# Patient Record
Sex: MALE | Race: WHITE
[De-identification: names, ages, dates, MRNs, and addresses within clinical notes are randomized per-mention and may not be internally consistent; named-entity substitution may affect disease eponyms.]

---

## 2019-07-23 NOTE — HP
*** AMENDED REPORT NOW INCLUDES DESIGNATED COSIGNER - ESIGNED BEFORE 
ADJUSTMENTS ***



PREOPERATIVE HISTORY AND PHYSICAL:

 

DATE OF ADMISSION:  07/30/19

 

DATE OF OFFICE VISIT:

 

ATTENDING PHYSICIAN:  Dr. Helen Napier.* (DICTATED BY NICHOLE TAYLOR)

 

SURGERY TO BE PERFORMED:  Right total knee arthroplasty.

 

CHIEF COMPLAINT:  Right knee pain.

 

HISTORY OF PRESENT ILLNESS:  Mr. Fernandes is a 64-year-old male with over 3 years 
of increasingly severe right knee pain.  He has difficulty walking for more 
than 1 block.  He has difficulty ambulating on stairs and uneven surfaces. 
Antiinflammatories, rest, and elevation have helped this pain somewhat.  He has 
had cortisone injections and has worn a brace without relief of pain.  He now 
elects to proceed with right total knee arthroplasty.

 

PAST MEDICAL HISTORY:  Significant for hypertension, high cholesterol, 
osteoarthritis, type 2 diabetes, morbid obesity.

 

PAST SURGICAL HISTORY:  He has had a TURP, appendectomy, hernia repair, 
cervical spine surgery, and partial parathyroid removal.

 

MEDICATIONS:

1.  Metformin HCl ER 50 mg 2 tablets b.i.d.

2.  Humalog KwikPen 200 units per mL t.i.d. with meals.

3.  Atorvastatin 20 mg daily.

4.  Finasteride 5 mg 1 p.o. daily.

5.  Invokana 300 mg 1 p.o. daily.

6.  Levemir FlexTouch 100 units per mL, administered 110 units subcu daily.

7.  Aspirin 81 mg daily.

8.  CoQ10 supplement 100 mg daily.

9.  Vitamin D3 at 5000 International Units daily.

10.  Irbesartan 300 mg daily.

11.  Amlodipine 5 mg p.o. daily.

 

ALLERGIES:  To PENICILLIN, which caused rash and hives.

 

FAMILY HISTORY:  Significant for hypertension and cancer.

 

SOCIAL HISTORY:  The patient lives with his wife, works as a .  
Remote history of tobacco use, quit 25 years ago.  Denies use of alcohol or 
recreational drug use.

 

REVIEW OF SYSTEMS:  Reviewed with the patient today.  Denies recent loss of 
consciousness, lightheadedness, dizziness, shortness of breath, chest pain, 
palpitations, gastrointestinal and genitourinary discomfort.  No neurologic 
symptoms.

 

                               PHYSICAL EXAMINATION

 

GENERAL:  The patient is alert and oriented x3, in no acute distress, pleasant, 
cooperative.

 

VITAL SIGNS:  Height 69.50 inches, weight 285.  Pulse 80, /84.

 

HEENT:  PERRLA.  EOMI.

 

NECK:  Supple.  Nontender.

 

LUNGS:  Clear to auscultation without wheeze.

 

HEART:  Regular rate and rhythm.  No murmur auscultated.

 

ABDOMEN:  Nontender.  Normoactive bowel sounds x4 quadrant.

 

EXTREMITIES:  Upper extremities within normal limits.  Right knee:  His skin is 
clear without excoriations or open lesions.  He has a moderate effusion to the 
right knee, 15 degrees to 120 degrees flexion with patellofemoral crepitus and 
pain.  There is a varus deformity.  There is tenderness along the medial joint 
line.  There is no gross varus or valgus instability.  Mild pitting edema is 
noted at the ankle.  He has active dorsiflexion and plantar flexion.  Full 
sensation to light touch, 2+ dorsalis pedis pulse.

 

 ASSESSMENT:  Advanced osteoarthritis of the right knee, medial and 
patellofemoral compartments.

 

PLAN:  The patient has failed conservative management and elects to proceed 
with right total knee arthroplasty.  He is scheduled for surgical intervention 
with Dr. Helen Napier at [facility] on 07/30/19.  He has been cleared by his primary 
care provider and his endocrinologist for surgery.  He will follow up in the 
office in roughly 10 to 14 days postoperatively.

 

 ____________________________________ NICHOLE TAYLOR

 

[id number] #: [id number]

MTDD

## 2019-07-30 ENCOUNTER — HOSPITAL ENCOUNTER (INPATIENT)
Dept: HOSPITAL 25 - AA | Age: 64
LOS: 2 days | Discharge: HOME | DRG: 302 | End: 2019-08-01
Attending: ORTHOPAEDIC SURGERY | Admitting: ORTHOPAEDIC SURGERY
Payer: COMMERCIAL

## 2019-07-30 DIAGNOSIS — Z80.8: ICD-10-CM

## 2019-07-30 DIAGNOSIS — Z87.891: ICD-10-CM

## 2019-07-30 DIAGNOSIS — Z79.84: ICD-10-CM

## 2019-07-30 DIAGNOSIS — Z79.4: ICD-10-CM

## 2019-07-30 DIAGNOSIS — Z88.0: ICD-10-CM

## 2019-07-30 DIAGNOSIS — Z79.899: ICD-10-CM

## 2019-07-30 DIAGNOSIS — R35.0: ICD-10-CM

## 2019-07-30 DIAGNOSIS — N40.0: ICD-10-CM

## 2019-07-30 DIAGNOSIS — M25.761: ICD-10-CM

## 2019-07-30 DIAGNOSIS — E78.00: ICD-10-CM

## 2019-07-30 DIAGNOSIS — M17.11: Primary | ICD-10-CM

## 2019-07-30 DIAGNOSIS — I10: ICD-10-CM

## 2019-07-30 DIAGNOSIS — Z80.1: ICD-10-CM

## 2019-07-30 DIAGNOSIS — E11.9: ICD-10-CM

## 2019-07-30 DIAGNOSIS — Z79.82: ICD-10-CM

## 2019-07-30 DIAGNOSIS — E66.01: ICD-10-CM

## 2019-07-30 DIAGNOSIS — E78.5: ICD-10-CM

## 2019-07-30 DIAGNOSIS — Z82.49: ICD-10-CM

## 2019-07-30 PROCEDURE — 85014 HEMATOCRIT: CPT

## 2019-07-30 PROCEDURE — 85018 HEMOGLOBIN: CPT

## 2019-07-30 PROCEDURE — 36415 COLL VENOUS BLD VENIPUNCTURE: CPT

## 2019-07-30 PROCEDURE — 62323 NJX INTERLAMINAR LMBR/SAC: CPT

## 2019-07-30 PROCEDURE — 85049 AUTOMATED PLATELET COUNT: CPT

## 2019-07-30 PROCEDURE — 0SRC069 REPLACEMENT OF RIGHT KNEE JOINT WITH OXIDIZED ZIRCONIUM ON POLYETHYLENE SYNTHETIC SUBSTITUTE, CEMENTED, OPEN APPROACH: ICD-10-PCS | Performed by: ORTHOPAEDIC SURGERY

## 2019-07-30 PROCEDURE — C1776 JOINT DEVICE (IMPLANTABLE): HCPCS

## 2019-07-30 PROCEDURE — 80048 BASIC METABOLIC PNL TOTAL CA: CPT

## 2019-07-30 RX ADMIN — INSULIN LISPRO SCH: 100 INJECTION, SOLUTION INTRAVENOUS; SUBCUTANEOUS at 17:44

## 2019-07-30 RX ADMIN — TRAMADOL HYDROCHLORIDE PRN MG: 50 TABLET, FILM COATED ORAL at 18:46

## 2019-07-30 RX ADMIN — OXYCODONE HYDROCHLORIDE PRN MG: 5 CAPSULE ORAL at 21:43

## 2019-07-30 RX ADMIN — ATORVASTATIN CALCIUM SCH MG: 20 TABLET, FILM COATED ORAL at 18:47

## 2019-07-30 RX ADMIN — DOCUSATE SODIUM SCH MG: 100 CAPSULE, LIQUID FILLED ORAL at 20:15

## 2019-07-30 RX ADMIN — MAGNESIUM HYDROXIDE SCH ML: 400 SUSPENSION ORAL at 20:16

## 2019-07-30 RX ADMIN — INSULIN GLARGINE SCH UNITS: 100 INJECTION, SOLUTION SUBCUTANEOUS at 21:42

## 2019-07-30 RX ADMIN — ACETAMINOPHEN SCH: 325 TABLET ORAL at 18:46

## 2019-07-30 RX ADMIN — OXYCODONE HYDROCHLORIDE AND ACETAMINOPHEN PRN TAB: 5; 325 TABLET ORAL at 17:22

## 2019-07-30 NOTE — PN
Progress Note





- Progress Note


Date of Service: 07/30/19


Note: 





Pt seen at bedside, pain is well controlled. Denies CP, SOB, dizziness, nausea. 

Dressing CDI, DF/PF intact, sensation intact to light touch distally.

## 2019-07-30 NOTE — CONS
CC:  Dr. Li; Dr. Napier; NICHOLE Gilman *

 

CONSULTATION REPORT:

 

DATE OF CONSULT:  19

 

PRIMARY CARE PROVIDER:  Dr. Li.

 

ORTHOPEDIC SURGEON:  Dr. Napier.

 

REQUESTING PROVIDER IN CONSULTATION:  NICHOLE Gilman.

 

ATTENDING PHYSICIAN:  Dr. Torres (dictated by NICHOLE Marsh).

 

REASON FOR CONSULTATION:  Co-medical management.

 

HISTORY OF PRESENT ILLNESS/HOSPITAL COURSE:  I refer you to Joaquina Mcdonnell's 
history and physical dictated on 19 for full and complete details, but in 
short, Mr. Fernandes is a 64-year-old male with a past medical history of diabetes, 
hypertension, hyperlipidemia, and osteoarthritis who presented to [facility] today for 
an elective right total knee arthroplasty having failed conservative therapy.  
He is seen postoperatively in the PACU.  He is complaining of drowsiness and 
fatigue.  He denies chest pain, shortness of breath, vision changes, headache.  
He denies pain in the knees, stating that he has decreased sensation to 
bilateral lower extremities due to nerve block.  He denies abdominal pain, 
nausea, vomiting, diarrhea.  He denies numbness and tingling in the extremities.

 

PAST MEDICAL HISTORY:

1.  Diabetes mellitus.

2.  Hypertension.

3.  Hyperlipidemia.

4.  BPH.

5.  Obesity, BMI of 41.3.

6.  Osteoarthritis.

 

PAST SURGICAL HISTORY:  TURP, appendectomy, hernia repair x2, partial 
parathyroidectomy, cervical spine.

 

HOME MEDICATIONS:

1.  Amlodipine 5 mg p.o. daily.

2.  Aspirin 81 mg p.o. daily.

3.  Atorvastatin 20 mg p.o. q.p.m.

4.  Canagliflozin 300 mg p.o. daily.

5.  Vitamin D3 cap 5000 units p.o. daily.

6.  Finasteride 5 mg p.o. daily.

7.  Insulin detemir 50 units subcu q.a.m., 60 units at bedtime.

8.  Insulin lispro sliding scale 16 to 24 units subcu t.i.d. with meals.

9.  Irbesartan 300 mg p.o. daily.

10.  Metformin 1000 mg p.o. b.i.d.

11.  CoQ10 at 100 mg p.o. daily.

 

DRUG ALLERGIES:  PENICILLIN, hives.

 

FAMILY HISTORY:  Father, hypertension,  due to lung cancer.  Mother, 
dementia.  Maternal grandmother, MI at age 60.  Paternal grandmother  
during childbirth.  Paternal grandfather, melanoma, brain tumor.

 

SOCIAL HISTORY:  The patient states that he quit smoking approximately 25 years 
ago.  Prior to that, he smoked less than a pack per day for approximately 10 
years. He does not drink alcohol.  He is a . He lives with his wife
; he has 4 children and 10 grandchildren.  In the event that he is unable to 
make his own medical decisions, he has appointed his daughter and healthcare 
proxy, Oksana Khan, to be his surrogate decision maker.

 

REVIEW OF SYSTEMS:  A 10-point review of systems has been performed and all the 
pertinent positives and negatives are in the HPI.  All other systems are 
negative.

 

PHYSICAL EXAMINATION:  General:  Mr. Fernandes is a well-developed, well-nourished, 
obese, older white male who is sitting up in bed.  He appears groggy, but is 
cooperative and appropriate and awake.  Vital Signs:  Temperature 97.2 temporal
, heart rate 67, respiratory rate 17, oxygen saturation 97% on 2 L O2, blood 
pressure 139/82.  HEENT:  PERRL.  EOMI.  Nonicteric sclerae.  Hearing grossly 
intact.  Oral mucous membranes are dry.  Cardiovascular:  Regular rate and 
rhythm with S1, S2 present without murmurs, rubs, clicks, or gallops.  There is 
no JVD.  There is no peripheral edema.  Radial and pedal pulses are palpable.  
Respiratory:  Symmetrical chest expansion without use of accessory muscles.  
Lungs:  Clear to auscultation bilaterally without rhonchi, wheezes, or rubs.  
There is no clubbing or cyanosis distally.  Abdomen:  Obese.  Bowel sounds in 
all quadrants.  The abdomen is soft. There is no tenderness to palpation.  
Musculoskeletal:  The patient is able to move all of his extremities.  The 
right knee has a clean, dry, and intact dressing with cryo unit in place.  Neuro
:  The patient is awake.  He is alert and oriented x3 with cranial nerves 
grossly intact.  He again is able to move all of his extremities.  He has 
decreased sensation to bilateral lower extremities.

 

ASSESSMENT AND PLAN:  Mr. Fernandes is a 64-year-old male with a past medical 
history of diabetes, hypertension, hyperlipidemia who presented to [facility] today 
for an elective right total knee arthroplasty.  The patient will be admitted 
inpatient for:

1.  Right total knee arthroplasty.  Management per orthopedics.

2.  Diabetes mellitus.  The patient is on detemir 50 a.m., 60 p.m., lispro 
sliding scale, canagliflozin, metformin at home.  His 2 oral medications will 
be held while he is in the hospital.  We will continue his long-acting insulin 
and lispro sliding scale.

3.  Hypertension.  Continue home medication, amlodipine.  Hold irbesartan at 
this time.

4.  Hyperlipidemia.  Continue atorvastatin.

5.  BPH.  Continue finasteride.

6.  DVT prophylaxis.  Per orthopedics, apixaban 2.5 mg b.i.d.

7.  Code status.  Full code.

 

TIME SPENT:  Approximately 30 minutes were spent on this admission, greater 
than half of that time was spent with the patient obtaining history, performing 
physical, and reviewing the plan of care.

 

The case has been discussed with my attending, Dr. Torres, who is in agreement 
with the plan of care.

 

____________________________________ NICHOLE MARSH

 

[id number]/CPS #: [id number]

MTDVINCENT

## 2019-07-30 NOTE — OP
Operative Report - Blank





- Operative Report


Date of Operation: 07/30/19


Note: 





ARTHUR MOE


1955





Date of Surgery: 7/30/19





Helen Napier MD





Assistant: RASHAD VARELA did help throughout the procedure with preparation of 

the knee, wound retraction, manipulation of the knee, and wound closure.  





Anesthesiologist: JOAQUIM Avilez MD





Anesthesia Type: Spinal 





Preoperative Diagnosis: Right severe degenerative osteoarthritis of the knee





Postoperative Diagnosis: As above





Procedure Performed: Right Total Knee Arthroplasty





Tourniquet time: 54 minutes





Complications: None





Specimen: Bone and cartilage from the right knee joint sent to pathology.  





Hardware Used: Cemented Smith and Nephew total knee hardware was used - For the 

femur a size 7 right oxinium legion posterior stabilized femoral component, for 

the tibia a size 7 right ceci II tibial baseplate, for the insert a size 9mm 

7-8 posterior stabilized articular polyethylene insert, and for the patella a 

size 35 3-peg all poly patella.  





Brief History/Indication: ARTHUR MOE was known in clinic and had a history of 

severe right knee pain and swelling. He failed conservative treatment with anti-

inflammatories, pain pills, intra-articular injections and physical therapy.  

He elected to undergo right total knee arthroplasty due to continued pain and 

decreased quality of life.  Radiographs showed severe end stage osteoarthritis 

of the knee with bone on bone contact.  Informed consent was obtained from the 

patient.  He understood the risks of surgery included but were not limited to: 

bleeding, infection, damage to nearby structures, intraoperative fracture, 

nerve palsy, failure of the hardware, early loosening, knee stiffness or loss 

of motion, anesthesia complications, stroke, heart attack, blood clot and 

death.  He wished to proceed.





Intra-Operative Findings: Intraoperatively the patient was noted to have severe 

loss of cartilage in all 3 compartments of the knee.  He had a suprapatellar 

pouch large loose body and extensive osteophyte formation.  





Description of the Procedure: 





ARTHUR MOE was identified in the preanesthesia unit. His right knee was 

marked as the correct operative side.  Informed consent was signed and placed 

in the chart. The patient was taken to the operating room and placed under 

anesthesia without complication. A velez catheter was placed. A tourniquet was 

placed on the right thigh. The right lower extremity was prepped and draped in 

the usual sterile fashion. Preoperative time-out was made to correctly identify 

the patient, side and site. Appropriate intraoperative antibiotics were given 

within one hour of incision.





Tourniquet was inflated. A midline incision was made and carried sharply down 

to the extensor mechanism. A new 10 blade was used to make a standard medial 

parapatellar arthrotomy. The patella was subluxed laterally. Electrocautery was 

used to dissect soft tissue off the superomedial tibia to the midsagittal 

plane.  The knee was flexed up. The anterior horn of the lateral meniscus and 

the ACL were sharply incised. A drill was used to enter the distal femur. The 

intramedullary distal femoral cutting guide was pinned on the distal femur. The 

oscillating saw was used to make the distal femoral cut.





The external rotation guide was pinned on the distal femur and the distal femur 

was sized to a size 7. The size 7 multi-cutting jig was pinned on the distal 

femur. The oscillating saw was used to make the appropriate 4 chamfer cuts.  

Next the PCL was completely released.  The extramedullary tibial cutting guide 

was pinned on the proximal tibia and the oscillating saw was used to make the 

proximal tibial cut perpendicular to the mechanical axis of the tibia. The bone 

was carefully removed.





The knee was brought out into full extension. The spacer block was placed and 

had excellent fit with the knee in full extension. The medial and lateral 

ligaments were well balanced. The flexion and extension gaps were well 

balanced. The knee was flexed up. Lamina  was placed both medially and 

laterally. Any remaining meniscus was removed with electrocautery.  Curved 

osteotome was used to remove any posterior osteophytes. The tibial tray and 

drop gabriel were placed and confirmed a satisfactory tibial cut.





The size 7 right femoral trial was impacted onto the distal femur. This trial 

had excellent fit and stability. The box for the posterior stabilized implant 

was prepared using a box cut osteotome and a reamer. Next a tibial tray trial 

and 9 mm insert trial was placed. The knee was taken through a range of motion 

and had full extension to 130 degrees of flexion. Patellofemoral tracking was 

satisfactory.





The patella was inverted and sized to a size 35. Three peg holes were drilled 

through the size 35 drill guide. The trial patella was placed and the knee was 

taken through a range of motion. There was satisfactory patellofemoral 

tracking. All trials were removed. The tibia was subluxed anteriorly and sized 

to a size 7. The proximal tibial was prepared with a size 7 keel punch.  All 

bony cut surfaces were irrigated with sterile saline and dried. Final implants 

were cemented into place starting with the tibia, followed by the femur, and 

last the patella. A 9 mm insert trial was placed and the knee was brought into 

full extension.





Tourniquet was turned down and the knee was copiously irrigated with sterile 

saline. Electrocautery was used to obtain meticulous hemostasis. Once the 

cement had fully cured, the insert trial was removed. Any excess cement was 

removed from around the hardware and capsule.  Final insert chosen was a 9 mm 

posterior stabilized Ceci II articular insert size 7-8. Stability of the 

insert was checked and noted to be stable.





The extensor mechanism was closed using number 1 vicryls. The rest of the 

incision was closed in a layered fashion using 0 and 2-0 vicryls. The skin was 

closed using 3-0 nylon suture. Sterile xeroform, 4x4s and webril were used to 

cover the incision.  Ace wrap and cold pack were used to cover the dressings. 

The patients anesthesia was reversed without difficulty. He was taken to the 

PACU in stable condition.  Intended weight-bearing will be as tolerated.

## 2019-07-31 LAB
ANION GAP SERPL CALC-SCNC: 9 MMOL/L (ref 2–11)
BUN SERPL-MCNC: 18 MG/DL (ref 6–24)
BUN/CREAT SERPL: 26.5 (ref 8–20)
CALCIUM SERPL-MCNC: 9.8 MG/DL (ref 8.6–10.3)
CHLORIDE SERPL-SCNC: 103 MMOL/L (ref 101–111)
GLUCOSE SERPL-MCNC: 191 MG/DL (ref 70–100)
HCO3 SERPL-SCNC: 24 MMOL/L (ref 22–32)
HCT VFR BLD AUTO: 40 % (ref 42–52)
HGB BLD-MCNC: 14.1 G/DL (ref 14–18)
PLATELET # BLD AUTO: 218 10^3/UL (ref 150–450)
POTASSIUM SERPL-SCNC: 4.1 MMOL/L (ref 3.5–5)
SODIUM SERPL-SCNC: 136 MMOL/L (ref 135–145)

## 2019-07-31 RX ADMIN — OXYCODONE HYDROCHLORIDE PRN MG: 5 CAPSULE ORAL at 01:57

## 2019-07-31 RX ADMIN — TRAMADOL HYDROCHLORIDE PRN MG: 50 TABLET, FILM COATED ORAL at 16:43

## 2019-07-31 RX ADMIN — AMLODIPINE BESYLATE SCH MG: 5 TABLET ORAL at 09:41

## 2019-07-31 RX ADMIN — MAGNESIUM HYDROXIDE SCH ML: 400 SUSPENSION ORAL at 20:22

## 2019-07-31 RX ADMIN — ACETAMINOPHEN SCH MG: 325 TABLET ORAL at 01:57

## 2019-07-31 RX ADMIN — ATORVASTATIN CALCIUM SCH MG: 20 TABLET, FILM COATED ORAL at 17:45

## 2019-07-31 RX ADMIN — INSULIN GLARGINE SCH UNITS: 100 INJECTION, SOLUTION SUBCUTANEOUS at 20:23

## 2019-07-31 RX ADMIN — TRAMADOL HYDROCHLORIDE PRN MG: 50 TABLET, FILM COATED ORAL at 10:33

## 2019-07-31 RX ADMIN — DOCUSATE SODIUM SCH MG: 100 CAPSULE, LIQUID FILLED ORAL at 09:40

## 2019-07-31 RX ADMIN — OXYCODONE HYDROCHLORIDE PRN MG: 5 CAPSULE ORAL at 12:20

## 2019-07-31 RX ADMIN — MAGNESIUM HYDROXIDE SCH ML: 400 SUSPENSION ORAL at 09:41

## 2019-07-31 RX ADMIN — APIXABAN SCH MG: 2.5 TABLET, FILM COATED ORAL at 20:22

## 2019-07-31 RX ADMIN — FINASTERIDE SCH MG: 5 TABLET, FILM COATED ORAL at 09:41

## 2019-07-31 RX ADMIN — INSULIN LISPRO SCH UNITS: 100 INJECTION, SOLUTION INTRAVENOUS; SUBCUTANEOUS at 11:44

## 2019-07-31 RX ADMIN — DOCUSATE SODIUM SCH MG: 100 CAPSULE, LIQUID FILLED ORAL at 20:23

## 2019-07-31 RX ADMIN — ACETAMINOPHEN SCH MG: 325 TABLET ORAL at 17:45

## 2019-07-31 RX ADMIN — ACETAMINOPHEN SCH MG: 325 TABLET ORAL at 09:40

## 2019-07-31 RX ADMIN — INSULIN LISPRO SCH UNITS: 100 INJECTION, SOLUTION INTRAVENOUS; SUBCUTANEOUS at 08:18

## 2019-07-31 RX ADMIN — INSULIN GLARGINE SCH UNITS: 100 INJECTION, SOLUTION SUBCUTANEOUS at 09:41

## 2019-07-31 RX ADMIN — OXYCODONE HYDROCHLORIDE PRN MG: 5 CAPSULE ORAL at 07:23

## 2019-07-31 RX ADMIN — OXYCODONE HYDROCHLORIDE AND ACETAMINOPHEN PRN TAB: 5; 325 TABLET ORAL at 22:55

## 2019-07-31 RX ADMIN — TRAMADOL HYDROCHLORIDE PRN MG: 50 TABLET, FILM COATED ORAL at 04:23

## 2019-07-31 RX ADMIN — Medication SCH UNITS: at 09:41

## 2019-07-31 RX ADMIN — INSULIN LISPRO SCH UNITS: 100 INJECTION, SOLUTION INTRAVENOUS; SUBCUTANEOUS at 16:40

## 2019-07-31 RX ADMIN — APIXABAN SCH MG: 2.5 TABLET, FILM COATED ORAL at 09:41

## 2019-07-31 NOTE — PN
Progress Note





- Progress Note


Date of Service: 07/31/19


SOAP: 


Subjective:


[]Patient seen and examined at bedside. Pain is well controlled, thigh is 

reported as sore. Denies CP, SOB, dizziness, nausea. Upon standing he felt 

lightheaded this morning which resolved with rest, he was nauseous with 

breakfast which has also since resolved. 





Objective:


[]General: Appears well, NAD


RLE: Right knee dressing CDI, thigh is soft, DF/PF intact, DP2+, sensation 

intact to light touch distally. 


Calves supple and nontender without erythema, edema or palpable cords 





Assessment:


[]POD 1 sp RTK





Plan:


[]WBAT


PT/OT


Eliquis 2.5 mg po BID x 30 days post op 


Encouraged IS


Massage ordered from [facility]


Anticipate home tomorrow 





 Vital Signs











Temp  98.4 F   07/31/19 07:49


 


Pulse  88   07/31/19 07:49


 


Resp  18   07/31/19 10:33


 


BP  110/55   07/31/19 07:49


 


Pulse Ox  91   07/31/19 07:49








 Intake & Output











 07/30/19 07/31/19 07/31/19





 18:59 06:59 18:59


 


Intake Total  700 600


 


Output Total  2725 225


 


Balance  -2025 375


 


Weight 279 lb 12.8 oz  


 


Intake:   


 


  Oral  700 600


 


Output:   


 


  Urine   225


 


  Pelaez  2725 


 


Other:   


 


  Estimated Void   Large


 


  # Voids   1








 Laboratory Last Values











Hgb  14.1 g/dL (14.0-18.0)   07/31/19  05:54    


 


Hct  40 % (42-52)  L  07/31/19  05:54    


 


Plt Count  218 10^3/uL (150-450)   07/31/19  05:54    


 


MPV  6.7 fL (7.4-10.4)  L  07/31/19  05:54    


 


Sodium  136 mmol/L (135-145)   07/31/19  05:54    


 


Potassium  4.1 mmol/L (3.5-5.0)   07/31/19  05:54    


 


Chloride  103 mmol/L (101-111)   07/31/19  05:54    


 


Carbon Dioxide  24 mmol/L (22-32)   07/31/19  05:54    


 


Anion Gap  9 mmol/L (2-11)   07/31/19  05:54    


 


BUN  18 mg/dL (6-24)   07/31/19  05:54    


 


Creatinine  0.68 mg/dL (0.67-1.17)   07/31/19  05:54    


 


Est GFR ( Amer)  141.2  (>60)   07/31/19  05:54    


 


Est GFR (Non-Af Amer)  116.7  (>60)   07/31/19  05:54    


 


BUN/Creatinine Ratio  26.5  (8-20)  H  07/31/19  05:54    


 


Glucose  191 mg/dL ()  H  07/31/19  05:54    


 


POC Glucose (mg/dL)  124 mg/dL ()  H  07/30/19  17:28    


 


Calcium  9.8 mg/dL (8.6-10.3)   07/31/19  05:54

## 2019-07-31 NOTE — PN
Subjective


Date of Service: 07/31/19


Interval History: 





Pt is doing well today.  He rates his pain at 3/10.  He notes he has been up 

with PT 2 times and it was "fair."  He has less pain with straightening.  He 

states he had a headache yesterday, but this is resolved.  He is urinating well 

and sensation has returned to the LE.  He denies CP, SOB, abd pain, n/v/d, calf 

tenderness.








Objective


Active Medications: 








Acetaminophen (Tylenol Tab*)  975 mg PO Q8H DEONNA


Amlodipine Besylate (Norvasc Tab*)  5 mg PO QAM DEONNA


Apixaban (Eliquis*)  2.5 mg PO BID DEONNA


Atorvastatin Calcium (Lipitor*)  20 mg PO QPM DEONNA


Bisacodyl (Dulcolax Supp*)  10 mg GA DAILY PRN


Cholecalciferol (Vitamin D Tab*)  5,000 units PO QAM DEONNA


Cyclobenzaprine HCl (Flexeril Tab*)  10 mg PO TID PRN


Dextrose (Dextrose 50% Vial 50 Ml*)  25 ml IV PUSH .FOR FS < 60 - SS PRN


Diphenhydramine HCl (Benadryl Iv*)  25 mg IV Q6H PRN


Diphenhydramine HCl (Benadryl Po*)  25 mg PO Q6H PRN


Docusate Sodium (Colace Cap*)  100 mg PO BID DEONNA


Finasteride (Proscar Tab*)  5 mg PO QAM DEONNA


Lactated Ringer's (Lactated Ringers 1000 Ml Bag*)  1,000 mls @ 100 mls/hr IV 

PER RATE DEONNA


Insulin Glargine (Lantus(*))  40 units SUBCUT QAM DEONNA


Insulin Glargine (Lantus(*))  24 units SUBCUT BEDTIME DEONNA


Insulin Human Lispro (Humalog*)  0 units SUBCUT AC DEONNA; Protocol


Lactulose (Lactulose*)  30 ml PO Q6H PRN


Magnesium Hydroxide (Milk Of Magnesia Liq*)  30 ml PO BID DEONNA


Magnesium Hydroxide (Milk Of Magnesia Liq*)  30 ml PO Q6H PRN


Morphine Sulfate (Morphine 4 Mg/Ml Vial (1 Ml))  4 mg IV Q2H PRN


Ondansetron HCl (Zofran Inj*)  4 mg IV Q6H PRN


Oxycodone HCl (Roxycodone Tab*)  5 mg PO Q4H PRN


Oxycodone/Acetaminophen (Percocet 5/325 Tab*)  2 tab PO Q3H PRN


Oxycodone/Acetaminophen (Percocet 5/325 Tab*)  1 tab PO Q3H PRN


Polyethylene Glycol/Electrolytes (Miralax*)  17 gm PO DAILY PRN


Tramadol HCl (Ultram*)  50 mg PO Q6H PRN








Vital Signs:











Temp Pulse Resp BP Pulse Ox


 


 98.4 F   85   18   133/73   93 


 


 07/31/19 11:57  07/31/19 11:57  07/31/19 12:20  07/31/19 11:57  07/31/19 11:57











Oxygen Devices in Use Now: None


Appearance: Pt is sitting in chair having lunch with LE at ground.  He is 

comfortable, appears to be in no acute distress.


Eyes: No Scleral Icterus, PERRLA


Ears/Nose/Mouth/Throat: NL Teeth, Lips, Gums, Clear Oropharnyx, Mucous 

Membranes Moist


Neck: NL Appearance and Movements; NL JVP, Trachea Midline


Respiratory: Symmetrical Chest Expansion and Respiratory Effort, Clear to 

Auscultation


Cardiovascular: NL Sounds; No Murmurs; No JVD, RRR


Abdominal: NL Sounds; No Tenderness; No Distention, No Hepatosplenomegaly


Extremities: No Clubbing, Cyanosis, - - 1+ pitting edema b/l LE edema.  R knee 

with CDI dressing; cryo unit in place.  Sensation, distal pulses intact.


Neurological: Alert and Oriented x 3


Result Diagrams: 


 07/31/19 05:54





 07/31/19 05:54





Assess/Plan/Problems-Billing


Assessment: 





64 yom PMHx HTN, DM, HLD, obesity presents for elective RTKA.








- Patient Problems


(1) Status post total right knee replacement


Comment: 


-POD1


-Management per ortho team   





(2) Diabetes


Comment: 


-BG controlled


-Glargine 40 a.m., 24 p.m.


-Lispro ss


-Holding metformin, canagliflozin; may restart with d/c   





(3) Hypertension


Comment: 


-Well controlled


-Continue amlodipine


-Continue to hold irbesartan    





(4) Hyperlipidemia


Comment: 


-Continue home atorvastatin   





(5) BPH (benign prostatic hyperplasia)


Comment: 


-Continue finasteride    





(6) DVT prophylaxis


Comment: 


-Per ortho: apixiban   





(7) Full code status





Status and Disposition: 





Inpatient.  Discharge per ortho.

## 2019-08-01 VITALS — DIASTOLIC BLOOD PRESSURE: 86 MMHG | SYSTOLIC BLOOD PRESSURE: 133 MMHG

## 2019-08-01 LAB
HCT VFR BLD AUTO: 39 % (ref 42–52)
HGB BLD-MCNC: 13.9 G/DL (ref 14–18)
PLATELET # BLD AUTO: 215 10^3/UL (ref 150–450)

## 2019-08-01 RX ADMIN — OXYCODONE HYDROCHLORIDE PRN MG: 5 CAPSULE ORAL at 11:08

## 2019-08-01 RX ADMIN — INSULIN LISPRO SCH UNITS: 100 INJECTION, SOLUTION INTRAVENOUS; SUBCUTANEOUS at 08:05

## 2019-08-01 RX ADMIN — ACETAMINOPHEN SCH: 325 TABLET ORAL at 02:50

## 2019-08-01 RX ADMIN — TRAMADOL HYDROCHLORIDE PRN MG: 50 TABLET, FILM COATED ORAL at 08:54

## 2019-08-01 RX ADMIN — MAGNESIUM HYDROXIDE SCH ML: 400 SUSPENSION ORAL at 08:55

## 2019-08-01 RX ADMIN — INSULIN GLARGINE SCH UNITS: 100 INJECTION, SOLUTION SUBCUTANEOUS at 08:05

## 2019-08-01 RX ADMIN — OXYCODONE HYDROCHLORIDE PRN MG: 5 CAPSULE ORAL at 06:47

## 2019-08-01 RX ADMIN — APIXABAN SCH MG: 2.5 TABLET, FILM COATED ORAL at 08:55

## 2019-08-01 RX ADMIN — AMLODIPINE BESYLATE SCH MG: 5 TABLET ORAL at 08:55

## 2019-08-01 RX ADMIN — Medication SCH UNITS: at 08:55

## 2019-08-01 RX ADMIN — FINASTERIDE SCH MG: 5 TABLET, FILM COATED ORAL at 08:55

## 2019-08-01 RX ADMIN — DOCUSATE SODIUM SCH MG: 100 CAPSULE, LIQUID FILLED ORAL at 08:54

## 2019-08-01 NOTE — PN
Progress Note





- Progress Note


Date of Service: 08/01/19


SOAP: 


Subjective:


[]Pt seen at bedside. He feels well without chest pain, shortness of breath, 

palpitations, abd pain, nausea or dizziness. His pain is well controlled and he 

desires DC home. 








Objective:


[]General: Appears well, NAD


RLE: Right knee dressing changed by Dr Napier this morning, dressing remains CDI

, thigh is soft, DF/PF intact, DP2+, sensation intact to light touch distally. 


Calves supple and nontender without erythema, edema or palpable cords 





Assessment:


[]POD 2 sp RTK





Plan:


[]WBAT


PT/OT


Eliquis 2.5 mg po BID x 30 days post op 


Encouraged IS


Massage ordered from [facility]


Anticipate home today, will monitor HR which is in the 90s, though it was 98bpm 

at preop testing and radial pulse regular. 





 Vital Signs











Temp  97.8 F   08/01/19 07:33


 


Pulse  97   08/01/19 08:59


 


Resp  18   08/01/19 09:21


 


BP  133/86   08/01/19 07:33


 


Pulse Ox  96   08/01/19 08:00








 Intake & Output











 07/31/19 08/01/19 08/01/19





 18:59 06:59 18:59


 


Intake Total 1994 600 480


 


Output Total 750 2200 725


 


Balance 1244 -1600 -245


 


Intake:   


 


  IV Fluids 884  


 


      


 


  Medicated   


 


    Clindamycin 110  


 


  Oral 1000 600 480


 


Output:   


 


  Urine 750 2200 725


 


Other:   


 


  Estimated Void Large  


 


  # Voids 1  








 Laboratory Last Values











Hgb  13.9 g/dL (14.0-18.0)  L  08/01/19  05:24    


 


Hct  39 % (42-52)  L  08/01/19  05:24    


 


Plt Count  215 10^3/uL (150-450)   08/01/19  05:24    


 


MPV  7.4 fL (7.4-10.4)   08/01/19  05:24    


 


Sodium  136 mmol/L (135-145)   07/31/19  05:54    


 


Potassium  4.1 mmol/L (3.5-5.0)   07/31/19  05:54    


 


Chloride  103 mmol/L (101-111)   07/31/19  05:54    


 


Carbon Dioxide  24 mmol/L (22-32)   07/31/19  05:54    


 


Anion Gap  9 mmol/L (2-11)   07/31/19  05:54    


 


BUN  18 mg/dL (6-24)   07/31/19  05:54    


 


Creatinine  0.68 mg/dL (0.67-1.17)   07/31/19  05:54    


 


Est GFR ( Amer)  141.2  (>60)   07/31/19  05:54    


 


Est GFR (Non-Af Amer)  116.7  (>60)   07/31/19  05:54    


 


BUN/Creatinine Ratio  26.5  (8-20)  H  07/31/19  05:54    


 


Glucose  191 mg/dL ()  H  07/31/19  05:54    


 


POC Glucose (mg/dL)  124 mg/dL ()  H  07/30/19  17:28    


 


Calcium  9.8 mg/dL (8.6-10.3)   07/31/19  05:54

## 2019-08-01 NOTE — DS
Orthopedic Discharge Summary





- Discharge Summary





Date of Admission:19


Date of Discharge: 19


Date of Surgery: 19


Attending Orthopedic Provider: Dr Napier


Pre-operative Diagnosis: right knee osteoarthritis


Operative Procedure: right total knee replacement


Disposition of Patient: home


Condition of Patient: stable


History: ARTHUR MOE is a 64 year old M with years of increasingly severe 

right knee pain. Patient has failed conservative management and has elected to 

undergo a right total knee replacement


Hospital Course: ARTHUR was admitted to [facility] on 19. 

Patient underwent a right total knee replacement without complication followed 

by a brief recovery in PACU and transfer to the Short Stay Surgical Unit in 

stable condition. Our hospitalist service, physical therapy and occupational 

therapy also participated in this patients care. Post-op day 1: patient was 

alert and in no acute distress. Dressing was clean, dry and intact. Operative 

extremity dorsiflexion and plantarflexion intact, sensation intact to light 

touch distally, DP2+. Post-op day two: dressing was changed, incision was clean

, dry and intact. Patient was deemed to be medically and orthopedically stable 

for discharge. Physical therapy goals were met.





 Home Medications











 Medication  Instructions  Recorded  Confirmed  Type


 


Aspirin [Aspirin EC] 81 mg PO QPM 19 History


 


Atorvastatin* [Lipitor 20 MG*] 20 mg PO QPM 19 History


 


Canagliflozin (NF) [Invokana (NF)] 300 mg PO QAM 19 History


 


Cholecalciferol CAP/TAB(NF) 5,000 unit PO QAM 19 History





[Vitamin D3 CAP/TAB (NF)]    


 


Finasteride TAB* [Proscar TAB*] 5 mg PO QAM 19 History


 


Insulin Detemir [Levemir Flextouch] 50 units SUBCUT QAM 19 

History


 


Insulin Detemir [Levemir Flextouch] 60 units SUBCUT BEDTIME 19 

History


 


Insulin Lispro [Humalog Kwikpen 16 - 24 units SUBCUT TID WITH MEALS 19 History





U-200]    


 


Irbesartan 300 mg PO QAM 19 History


 


Ubidecarenone [Co Q-10] 100 mg PO QPM 19 History


 


amLODIPine TAB* [Norvasc 5 mg TAB*] 5 mg PO QAM 19 History


 


metFORMIN* [Glucophage 500 MG TAB 2 tab PO BID 19 History





*]    


 


Acetaminophen TAB* [Tylenol TAB*] 975 mg PO Q8H  tab 19  Rx


 


Apixaban* [Eliquis*] 2.5 mg PO BID #60 tab 19  Rx


 


Docusate CAP* [Colace Cap*] 100 mg PO BID PRN #90 cap 19  Rx


 


oxyCODONE TAB* [Roxycodone TAB 5 5 mg PO Q4H PRN #20 tab MDD 4 19  Rx





mg*]    


 


traMADol TAB* [Ultram*] 50 mg PO Q6H PRN #56 tab MDD 8 19  Rx











Discharge Instructions following Orthopedic Surgery:





Activity:  


* Weight Bearing as tolerated


* Continue physical therapy and occupational therapy exercises as shown


* Continue home physical therapy, may transition to outpatient therapy when 

ready per home PT 





Wound care: 


* OK to shower on post-op day 3 (Friday). No bathing, swimming, or submerging 

wound. 


* Use gentle soap, pat dry. Cover with gauze, ACE wrap and tape.


* Visiting home nurse to do wound checks.





Call Orthopedic office for: 


* Increased drainage


* Redness


* Increased pain


* Fever 





***Go to ER with shortness of breath or chest pain.***





Diet: 


* Regular diet


* Increase fluids and fiber to prevent constipation. 


* Continue to use stool softeners, call office if no bowel motion within 48 

hours.





Medications


See Home Medication List in your packet for medications that you should take 

after discharge.





DVT Prophylaxis:





   Eliquis Dosin.5 mg, 1 tab every 12 hours x 30 days.  This medication 

increases bleeding tendency





Pain Control:





   Tramadol 50 mg. take 1 tab every 6 hours as needed for moderate pain. May 

take 2 tabs every 6 hours for more severe pain but not to exceed a total of 8 

tabs in one day. Hold for sedation. Wean off as soon as pain allows 





Supplement pain control with over the counter Tylenol (acetaminophen). Maximum 

daily dose of Tylenol is 4000 mg from all sources.





Oxycodone 5 mg tabs 1 tab every 4 hours as needed for severe breakthrough pain 

now controlled with tylenol and tramadol. May use up to 4 tabs in a day. Hold 

for sedation. Wean off as soon as pain allows.





Antibiotics are required prior to any dental work.








FOLLOW UP: Follow up with  [Karthikeyan] Within 10-14 days, call for appointment





Please call our office with any questions or concerns ([phone])

## 2019-08-02 NOTE — PN
Progress Note





- Progress Note


Date of Service: 08/02/19


Note: 





Pt called stating that he has urinary frequency, burning with urination since 

discharge. He had a Pelaez in during surgery. will start Cefdinir and [place] it [place] 

[place] [facility] [place] UA plus cx script sent to pt.